# Patient Record
Sex: FEMALE | Race: WHITE | NOT HISPANIC OR LATINO | Employment: OTHER | URBAN - METROPOLITAN AREA
[De-identification: names, ages, dates, MRNs, and addresses within clinical notes are randomized per-mention and may not be internally consistent; named-entity substitution may affect disease eponyms.]

---

## 2017-01-04 ENCOUNTER — TELEPHONE (OUTPATIENT)
Dept: OBSTETRICS AND GYNECOLOGY | Facility: CLINIC | Age: 32
End: 2017-01-04

## 2017-01-04 NOTE — TELEPHONE ENCOUNTER
----- Message from Genaro Mora sent at 1/4/2017 11:52 AM CST -----  Contact: Patient  _x1st Request  _ 2nd Request  _ 3rd Request    Who: PERI SOTELO [[id number]]    Why: Patient is calling in regards to scheduling a ultra sound. Patient is needing a call back from staff.     What Number to Call Back: Patient can be reached at [phone].    When to Expect a call back: (Before the end of the day)  -- if call after 3:00 call back will be tomorrow.

## 2017-01-04 NOTE — TELEPHONE ENCOUNTER
Pt was calling to schedule anatomical scan. Advised pt February schedule is not open yet and they will call her to schedule as soon as it is open. Pt verbalized understanding

## 2017-01-26 ENCOUNTER — ROUTINE PRENATAL (OUTPATIENT)
Dept: OBSTETRICS AND GYNECOLOGY | Facility: CLINIC | Age: 32
End: 2017-01-26
Payer: COMMERCIAL

## 2017-01-26 ENCOUNTER — CLINICAL SUPPORT (OUTPATIENT)
Dept: OBSTETRICS AND GYNECOLOGY | Facility: CLINIC | Age: 32
End: 2017-01-26
Payer: COMMERCIAL

## 2017-01-26 VITALS
BODY MASS INDEX: 22.52 KG/M2 | SYSTOLIC BLOOD PRESSURE: 100 MMHG | WEIGHT: 131.19 LBS | DIASTOLIC BLOOD PRESSURE: 60 MMHG

## 2017-01-26 DIAGNOSIS — Z23 NEEDS FLU SHOT: ICD-10-CM

## 2017-01-26 DIAGNOSIS — Z34.80 SUPERVISION OF OTHER NORMAL PREGNANCY: Primary | ICD-10-CM

## 2017-01-26 DIAGNOSIS — Z23 NEEDS FLU SHOT: Primary | ICD-10-CM

## 2017-01-26 PROBLEM — K51.90 ULCERATIVE COLITIS WITHOUT COMPLICATIONS: Status: ACTIVE | Noted: 2017-01-26

## 2017-01-26 PROCEDURE — 99999 PR PBB SHADOW E&M-EST. PATIENT-LVL II: CPT | Mod: PBBFAC,,, | Performed by: NURSE PRACTITIONER

## 2017-01-26 PROCEDURE — 99999 PR PBB SHADOW E&M-EST. PATIENT-LVL I: CPT | Mod: PBBFAC,,,

## 2017-01-26 PROCEDURE — 90686 IIV4 VACC NO PRSV 0.5 ML IM: CPT | Mod: S$GLB,,, | Performed by: NURSE PRACTITIONER

## 2017-01-26 PROCEDURE — 90471 IMMUNIZATION ADMIN: CPT | Mod: S$GLB,,, | Performed by: NURSE PRACTITIONER

## 2017-01-26 PROCEDURE — 0502F SUBSEQUENT PRENATAL CARE: CPT | Mod: S$GLB,,, | Performed by: NURSE PRACTITIONER

## 2017-01-26 NOTE — PROGRESS NOTES
Here for routine OB appt at 16w3d, with complaints of nasal congestion.  Discussed OTC Regular Sudafed.  Denies VB and cramping.  Pain, bleeding, and PTL precautions given.  Flu vaccine today   MFM anatomy scan scheduled for 2/16/17  F/U scheduled 4 weeks

## 2017-02-16 ENCOUNTER — OFFICE VISIT (OUTPATIENT)
Dept: MATERNAL FETAL MEDICINE | Facility: CLINIC | Age: 32
End: 2017-02-16
Attending: OBSTETRICS & GYNECOLOGY
Payer: COMMERCIAL

## 2017-02-16 DIAGNOSIS — Z36.89 ENCOUNTER FOR FETAL ANATOMIC SURVEY: ICD-10-CM

## 2017-02-16 DIAGNOSIS — Z34.02 ENCOUNTER FOR SUPERVISION OF NORMAL FIRST PREGNANCY IN SECOND TRIMESTER: ICD-10-CM

## 2017-02-16 PROCEDURE — 76805 OB US >/= 14 WKS SNGL FETUS: CPT | Mod: S$GLB,,, | Performed by: OBSTETRICS & GYNECOLOGY

## 2017-02-16 PROCEDURE — 99499 UNLISTED E&M SERVICE: CPT | Mod: S$GLB,,, | Performed by: OBSTETRICS & GYNECOLOGY

## 2017-02-16 NOTE — PROGRESS NOTES
A detailed fetal anatomical ultrasound was completed today.  See official report in the imaging section of [facility].  Ultrasound noted no obvious fetal abnormalities.  Ultrasound findings consistent with established dating.  Rescan as clinically indicated.

## 2017-02-16 NOTE — LETTER
February 16, 2017      Ana Sampson MD  [address]  [address]  [facility] [address]           [facility]  [address]  [facility] [address]  Phone: [phone]          Patient: Mei Thomas   MR Number: [id number]   YOB: 1985   Date of Visit: 2/16/2017       Dear Dr. Ana Sampson:    Thank you for referring Mei Thomas to me for evaluation. Attached you will find relevant portions of my assessment and plan of care.    If you have questions, please do not hesitate to call me. I look forward to following Mei Thomas along with you.    Sincerely,    Mohini Alicea MD    Enclosure  CC:  No Recipients    If you would like to receive this communication electronically, please contact [email or web address] or [phone] to request more information on [facility] access.    For providers and/or their staff who would like to refer a patient to [facility], please contact us through our one-stop-shop provider referral line, [facility], at [phone].    If you feel you have received this communication in error or would no longer like to receive these types of communications, please e-mail [email or web address]

## 2017-02-20 ENCOUNTER — ROUTINE PRENATAL (OUTPATIENT)
Dept: OBSTETRICS AND GYNECOLOGY | Facility: CLINIC | Age: 32
End: 2017-02-20
Attending: OBSTETRICS & GYNECOLOGY
Payer: COMMERCIAL

## 2017-02-20 VITALS — BODY MASS INDEX: 22.74 KG/M2 | WEIGHT: 132.5 LBS | DIASTOLIC BLOOD PRESSURE: 58 MMHG | SYSTOLIC BLOOD PRESSURE: 100 MMHG

## 2017-02-20 DIAGNOSIS — Z34.02 FIRST PREGNANCY, SECOND TRIMESTER: Primary | ICD-10-CM

## 2017-02-20 PROCEDURE — 0502F SUBSEQUENT PRENATAL CARE: CPT | Mod: S$GLB,,, | Performed by: OBSTETRICS & GYNECOLOGY

## 2017-02-20 PROCEDURE — 99999 PR PBB SHADOW E&M-EST. PATIENT-LVL II: CPT | Mod: PBBFAC,,, | Performed by: OBSTETRICS & GYNECOLOGY

## 2017-02-20 NOTE — PROGRESS NOTES
Patient complaining of bloating - No cramping, no VB, good FM, no LOF. OB glucose and CBC next visit.

## 2017-02-20 NOTE — MR AVS SNAPSHOT
[facility] [address]  [address]  [facility] [address]  Phone: [phone]  Fax: [phone]                  Mei Thomas   2017 11:15 AM   Routine Prenatal    Description:  Female : 1985   Provider:  Ana Sampson MD   Department:  [facility] [address]           Reason for Visit     Routine Prenatal Visit                To Do List           Goals (5 Years of Data)     None      [facility]     [facility] Nurse Care Line -  Assistance  Registered nurses in the [facility] Center provide clinical advisement, health education, appointment booking, and other advisory services.  Call for this free service at [phone].             Medications           Message regarding Medications     Verify the changes and/or additions to your medication regime listed below are the same as discussed with your clinician today.  If any of these changes or additions are incorrect, please notify your healthcare provider.             Verify that the below list of medications is an accurate representation of the medications you are currently taking.  If none reported, the list may be blank. If incorrect, please contact your healthcare provider. Carry this list with you in case of emergency.           Current Medications     CANASA 1,000 mg Supp     prenatal vitamin #45-iron-FA 28 mg iron- 1 mg Chew Take by mouth.    albuterol 90 mcg/actuation inhaler Take one to two puffs q 4-q6 hrs as needed for cough    valacyclovir (VALTREX) 1000 MG tablet For an outbreak take 2 tablets, followed 12 hours later by 2 more tablets.           Clinical Reference Information           Prenatal Vitals     Enc. Date GA Prenatal Vitals Prenatal Pulse Pain Level Urine Albumin/Glucose Edema Presentation Dilation/Effacement/Station    17 20w0d 100/58 (A) / 60.1 kg (132 lb 7.9 oz)   0 Negative / Negative       17 16w3d 100/60 / 59.5 kg (131 lb 2.8 oz)  / 145 / Absent  0  Negative / Negative None / None / None / No      16 12w3d 108/60 / 56.9 kg (125 lb 7.1 oz)  / 162  0 Negative / Negative          TW.307 kg (9 lb 7.9 oz)   Pregravid weight: 55.8 kg (123 lb)   Number of fetuses: 1       Your Vitals Were     BP Weight Last Period BMI       100/58 60.1 kg (132 lb 7.9 oz) 10/03/2016 (Exact Date) 22.74 kg/m2       Allergies as of 2017     No Known Allergies      Immunizations Administered on Date of Encounter - 2017     None      Language Assistance Services     ATTENTION: Language assistance services are available, free of charge. Please call [phone].      ATENCIÓN: Si ajla quentin, tiene a trent disposición servicios gratuitos de asistencia lingüística. Llame al [phone].     CHÚ Ý: N?u b?n nói Ti?ng Vi?t, có các d?ch v? h? tr? ngôn ng? mi?n phí dành cho b?n. G?i s? [phone].         [facility] [address] complies with applicable Federal civil rights laws and does not discriminate on the basis of race, color, national origin, age, disability, or sex.

## 2017-03-21 ENCOUNTER — ROUTINE PRENATAL (OUTPATIENT)
Dept: OBSTETRICS AND GYNECOLOGY | Facility: CLINIC | Age: 32
End: 2017-03-21
Payer: COMMERCIAL

## 2017-03-21 ENCOUNTER — LAB VISIT (OUTPATIENT)
Dept: LAB | Facility: OTHER | Age: 32
End: 2017-03-21
Attending: OBSTETRICS & GYNECOLOGY
Payer: COMMERCIAL

## 2017-03-21 VITALS
BODY MASS INDEX: 23.61 KG/M2 | WEIGHT: 137.56 LBS | SYSTOLIC BLOOD PRESSURE: 100 MMHG | DIASTOLIC BLOOD PRESSURE: 60 MMHG

## 2017-03-21 DIAGNOSIS — Z23 NEED FOR TDAP VACCINATION: ICD-10-CM

## 2017-03-21 DIAGNOSIS — Z34.02 FIRST PREGNANCY, SECOND TRIMESTER: ICD-10-CM

## 2017-03-21 DIAGNOSIS — Z34.80 SUPERVISION OF OTHER NORMAL PREGNANCY: Primary | ICD-10-CM

## 2017-03-21 LAB
BASOPHILS # BLD AUTO: 0.02 K/UL
BASOPHILS NFR BLD: 0.3 %
DIFFERENTIAL METHOD: ABNORMAL
EOSINOPHIL # BLD AUTO: 0.3 K/UL
EOSINOPHIL NFR BLD: 3.9 %
ERYTHROCYTE [DISTWIDTH] IN BLOOD BY AUTOMATED COUNT: 14.3 %
GLUCOSE SERPL-MCNC: 77 MG/DL
HCT VFR BLD AUTO: 33.5 %
HGB BLD-MCNC: 11.1 G/DL
LYMPHOCYTES # BLD AUTO: 1 K/UL
LYMPHOCYTES NFR BLD: 13.2 %
MCH RBC QN AUTO: 30.7 PG
MCHC RBC AUTO-ENTMCNC: 33.1 %
MCV RBC AUTO: 93 FL
MONOCYTES # BLD AUTO: 0.4 K/UL
MONOCYTES NFR BLD: 5.7 %
NEUTROPHILS # BLD AUTO: 6 K/UL
NEUTROPHILS NFR BLD: 76.5 %
PLATELET # BLD AUTO: 145 K/UL
PMV BLD AUTO: 10.7 FL
RBC # BLD AUTO: 3.61 M/UL
WBC # BLD AUTO: 7.78 K/UL

## 2017-03-21 PROCEDURE — 99999 PR PBB SHADOW E&M-EST. PATIENT-LVL II: CPT | Mod: PBBFAC,,, | Performed by: NURSE PRACTITIONER

## 2017-03-21 PROCEDURE — 85025 COMPLETE CBC W/AUTO DIFF WBC: CPT

## 2017-03-21 PROCEDURE — 36415 COLL VENOUS BLD VENIPUNCTURE: CPT

## 2017-03-21 PROCEDURE — 0502F SUBSEQUENT PRENATAL CARE: CPT | Mod: S$GLB,,, | Performed by: NURSE PRACTITIONER

## 2017-03-21 PROCEDURE — 82950 GLUCOSE TEST: CPT

## 2017-03-21 NOTE — MR AVS SNAPSHOT
[facility] [address]  [address]  [facility] [address]  Phone: [phone]  Fax: [phone]                  Mei Thomas   3/21/2017 8:20 AM   Routine Prenatal    Description:  Female : 1985   Provider:  Dulce Fierro NP   Department:  [facility] [address]           Reason for Visit     Routine Prenatal Visit                To Do List           Goals (5 Years of Data)     None      [facility]     [facility] Nurse Care Line -  Assistance  Registered nurses in the [facility] provide clinical advisement, health education, appointment booking, and other advisory services.  Call for this free service at [phone].             Medications           Message regarding Medications     Verify the changes and/or additions to your medication regime listed below are the same as discussed with your clinician today.  If any of these changes or additions are incorrect, please notify your healthcare provider.             Verify that the below list of medications is an accurate representation of the medications you are currently taking.  If none reported, the list may be blank. If incorrect, please contact your healthcare provider. Carry this list with you in case of emergency.           Current Medications     albuterol 90 mcg/actuation inhaler Take one to two puffs q 4-q6 hrs as needed for cough    CANASA 1,000 mg Supp     prenatal vitamin #45-iron-FA 28 mg iron- 1 mg Chew Take by mouth.    valacyclovir (VALTREX) 1000 MG tablet For an outbreak take 2 tablets, followed 12 hours later by 2 more tablets.           Clinical Reference Information           Prenatal Vitals     Enc. Date GA Prenatal Vitals Prenatal Pulse Pain Level Urine Albumin/Glucose Edema Presentation Dilation/Effacement/Station    3/21/17 24w1d 100/60 / 62.4 kg (137 lb 9.1 oz)  /  / Present  0 Negative / Negative       17 20w0d 100/58 (A) / 60.1 kg (132 lb 7.9 oz)  /  / Present   0 Negative / Negative None / None / None      17 16w3d 100/60 / 59.5 kg (131 lb 2.8 oz)  / 145 / Absent  0 Negative / Negative None / None / None / No      16 12w3d 108/60 / 56.9 kg (125 lb 7.1 oz)  / 162  0 Negative / Negative          TW.607 kg (14 lb 9.1 oz)   Pregravid weight: 55.8 kg (123 lb)   Number of fetuses: 1       Your Vitals Were     BP Weight Last Period BMI       100/60 62.4 kg (137 lb 9.1 oz) 10/03/2016 (Exact Date) 23.61 kg/m2       Allergies as of 3/21/2017     No Known Allergies      Immunizations Administered on Date of Encounter - 3/21/2017     None      Language Assistance Services     ATTENTION: Language assistance services are available, free of charge. Please call [phone].      ATENCIÓN: Si habla español, tiene a trent disposición servicios gratuitos de asistencia lingüística. Llame al [phone].     CHÚ Ý: N?u b?n nói Ti?ng Vi?t, có các d?ch v? h? tr? ngôn ng? mi?n phí dành cho b?n. G?i s? [phone].         [facility] [address] complies with applicable Federal civil rights laws and does not discriminate on the basis of race, color, national origin, age, disability, or sex.

## 2017-03-21 NOTE — PROGRESS NOTES
Here for routine OB appt at 24w1d, with complaints of sensation that she is not completely finished when she has bowel movements.  Discussed Colace PRN.  Reports + FM.  Denies VB and cramping.  Pain, bleeding, and PTL precautions given.  OB glucose and CBC today.  Tdap with next appt.  F/U scheduled 4 weeks

## 2017-04-13 ENCOUNTER — TELEPHONE (OUTPATIENT)
Dept: OBSTETRICS AND GYNECOLOGY | Facility: CLINIC | Age: 32
End: 2017-04-13

## 2017-04-13 NOTE — TELEPHONE ENCOUNTER
----- Message from Anisa Merchant sent at 4/13/2017  4:00 PM CDT -----  Contact: self  Patient is needing to r/s her appointment for 4/21/17. Patient can be reached at [phone].

## 2017-04-26 ENCOUNTER — CLINICAL SUPPORT (OUTPATIENT)
Dept: OBSTETRICS AND GYNECOLOGY | Facility: CLINIC | Age: 32
End: 2017-04-26
Payer: COMMERCIAL

## 2017-04-26 ENCOUNTER — ROUTINE PRENATAL (OUTPATIENT)
Dept: OBSTETRICS AND GYNECOLOGY | Facility: CLINIC | Age: 32
End: 2017-04-26
Attending: OBSTETRICS & GYNECOLOGY
Payer: COMMERCIAL

## 2017-04-26 VITALS — BODY MASS INDEX: 24.03 KG/M2 | WEIGHT: 140 LBS | DIASTOLIC BLOOD PRESSURE: 60 MMHG | SYSTOLIC BLOOD PRESSURE: 100 MMHG

## 2017-04-26 DIAGNOSIS — N89.8 VAGINAL DISCHARGE: Primary | ICD-10-CM

## 2017-04-26 DIAGNOSIS — Z23 NEED FOR TDAP VACCINATION: Primary | ICD-10-CM

## 2017-04-26 DIAGNOSIS — Z34.03 ENCOUNTER FOR SUPERVISION OF NORMAL FIRST PREGNANCY IN THIRD TRIMESTER: ICD-10-CM

## 2017-04-26 LAB
CANDIDA RRNA VAG QL PROBE: NEGATIVE
G VAGINALIS RRNA GENITAL QL PROBE: NEGATIVE
T VAGINALIS RRNA GENITAL QL PROBE: NEGATIVE

## 2017-04-26 PROCEDURE — 0502F SUBSEQUENT PRENATAL CARE: CPT | Mod: S$GLB,,, | Performed by: OBSTETRICS & GYNECOLOGY

## 2017-04-26 PROCEDURE — 90471 IMMUNIZATION ADMIN: CPT | Mod: S$GLB,,, | Performed by: NURSE PRACTITIONER

## 2017-04-26 PROCEDURE — 99999 PR PBB SHADOW E&M-EST. PATIENT-LVL I: CPT | Mod: PBBFAC,,,

## 2017-04-26 PROCEDURE — 87480 CANDIDA DNA DIR PROBE: CPT

## 2017-04-26 PROCEDURE — 90715 TDAP VACCINE 7 YRS/> IM: CPT | Mod: S$GLB,,, | Performed by: NURSE PRACTITIONER

## 2017-04-26 PROCEDURE — 99999 PR PBB SHADOW E&M-EST. PATIENT-LVL III: CPT | Mod: PBBFAC,,, | Performed by: OBSTETRICS & GYNECOLOGY

## 2017-04-26 NOTE — MR AVS SNAPSHOT
[facility] [address]  [address]  [facility] [address]  Phone: [phone]  Fax: [phone]                  Mei Thomas   2017 11:15 AM   Routine Prenatal    Description:  Female : 1985   Provider:  Ana Sampson MD   Department:  [facility] [address]           Reason for Visit     Routine Prenatal Visit                To Do List           Goals (5 Years of Data)     None      [facility]     [facility] On Call Nurse Care Line -  Assistance  Unless otherwise directed by your provider, please contact [facility], our nurse care line that is available for  assistance.     Registered nurses in the [facility] On Call Center provide: appointment scheduling, clinical advisement, health education, and other advisory services.  Call: [phone] (toll free)               Medications           Message regarding Medications     Verify the changes and/or additions to your medication regime listed below are the same as discussed with your clinician today.  If any of these changes or additions are incorrect, please notify your healthcare provider.             Verify that the below list of medications is an accurate representation of the medications you are currently taking.  If none reported, the list may be blank. If incorrect, please contact your healthcare provider. Carry this list with you in case of emergency.           Current Medications     albuterol 90 mcg/actuation inhaler Take one to two puffs q 4-q6 hrs as needed for cough    CANASA 1,000 mg Supp     prenatal vitamin #45-iron-FA 28 mg iron- 1 mg Chew Take by mouth.    valacyclovir (VALTREX) 1000 MG tablet For an outbreak take 2 tablets, followed 12 hours later by 2 more tablets.           Clinical Reference Information           Prenatal Vitals     Enc. Date GA Prenatal Vitals Prenatal Pulse Pain Level Urine Albumin/Glucose Edema Presentation Dilation/Effacement/Station    17 29w2d 100/60 /  63.5 kg (139 lb 15.9 oz)   0 Negative / Negative       3/21/17 24w1d 100/60 / 62.4 kg (137 lb 9.1 oz) 24 cm / 154 / Present  0 Negative / Negative None / None / None / No      17 20w0d 100/58 (A) / 60.1 kg (132 lb 7.9 oz)  /  / Present  0 Negative / Negative None / None / None      17 16w3d 100/60 / 59.5 kg (131 lb 2.8 oz)  / 145 / Absent  0 Negative / Negative None / None / None / No      16 12w3d 108/60 / 56.9 kg (125 lb 7.1 oz)  / 162  0 Negative / Negative          TW.707 kg (16 lb 15.9 oz)   Pregravid weight: 55.8 kg (123 lb)   Number of fetuses: 1       Your Vitals Were     BP Weight Last Period BMI       100/60 63.5 kg (139 lb 15.9 oz) 10/03/2016 (Exact Date) 24.03 kg/m2       Allergies as of 2017     No Known Allergies      Immunizations Administered on Date of Encounter - 2017     None      Language Assistance Services     ATTENTION: Language assistance services are available, free of charge. Please call [phone].      ATENCIÓN: Si josue saavedra, tiene a trent disposición servicios gratuitos de asistencia lingüística. Llame al [phone].     [facility] Ý: N?u b?n nói Ti?ng Vi?t, có các d?ch v? h? tr? ngôn ng? mi?n phí dành cho b?n. G?i s? [phone].         [facility] [address] complies with applicable Federal civil rights laws and does not discriminate on the basis of race, color, national origin, age, disability, or sex.

## 2017-04-26 NOTE — PROGRESS NOTES
No cramping, no VB, good FM, no LOF.PAtient complaining of vaginal discharge. Affirm done. Follow up in 3 weeks. PTL/bleeding precautions.

## 2017-04-28 NOTE — PROGRESS NOTES
Here for TDAP injection. Patient without complaint of pain at this time ,Injection given. Tolerated well. No pain noted after injection.  Advised to wait in lobby 15 minutes and report any adverse reactions.         Site:  LD

## 2017-05-10 ENCOUNTER — ROUTINE PRENATAL (OUTPATIENT)
Dept: OBSTETRICS AND GYNECOLOGY | Facility: CLINIC | Age: 32
End: 2017-05-10
Payer: COMMERCIAL

## 2017-05-10 VITALS
SYSTOLIC BLOOD PRESSURE: 102 MMHG | BODY MASS INDEX: 24.56 KG/M2 | WEIGHT: 143.06 LBS | DIASTOLIC BLOOD PRESSURE: 50 MMHG

## 2017-05-10 DIAGNOSIS — Z34.80 SUPERVISION OF OTHER NORMAL PREGNANCY: Primary | ICD-10-CM

## 2017-05-10 DIAGNOSIS — O22.43 HEMORRHOIDS DURING PREGNANCY IN THIRD TRIMESTER: ICD-10-CM

## 2017-05-10 PROCEDURE — 0502F SUBSEQUENT PRENATAL CARE: CPT | Mod: S$GLB,,, | Performed by: NURSE PRACTITIONER

## 2017-05-10 PROCEDURE — 99999 PR PBB SHADOW E&M-EST. PATIENT-LVL II: CPT | Mod: PBBFAC,,, | Performed by: NURSE PRACTITIONER

## 2017-05-10 RX ORDER — HYDROCORTISONE ACETATE PRAMOXINE HCL 1; 1 G/100G; G/100G
CREAM TOPICAL 3 TIMES DAILY
Qty: 28.4 G | Refills: 1 | Status: SHIPPED | OUTPATIENT
Start: 2017-05-10

## 2017-05-10 RX ORDER — HYDROCORTISONE ACETATE 25 MG/1
25 SUPPOSITORY RECTAL 2 TIMES DAILY
Qty: 12 SUPPOSITORY | Refills: 1 | Status: SHIPPED | OUTPATIENT
Start: 2017-05-10 | End: 2018-05-10

## 2017-05-10 NOTE — PROGRESS NOTES
Here for routine OB appt at 31w2d, with complaints of hemorrhoids.  Discussed Colace, Tucks pads, RX for cream and suppositories sent.  Reports good FM.  Denies LOF, denies VB, denies contractions.  Reviewed warning signs of Labor and Preeclampsia.  Daily FM counts reinforced.  Peds- in [facility].  Plans to breastfeed- will order pump.  F/U scheduled 2 weeks

## 2017-05-10 NOTE — MR AVS SNAPSHOT
[facility] [address]  [address]  [facility] [address]  Phone: [phone]  Fax: [phone]                  Mei Thomas   5/10/2017 8:40 AM   Routine Prenatal    Description:  Female : 1985   Provider:  Dulce Fierro NP   Department:  [facility] [address]           Reason for Visit     Routine Prenatal Visit                To Do List           Goals (5 Years of Data)     None      Follow-Up and Disposition     Return in about 2 weeks (around 2017).      [facility] On Call     [facility] On Call Nurse Care Line -  Assistance  Unless otherwise directed by your provider, please contact [facility], our nurse care line that is available for  assistance.     Registered nurses in the [facility] On Call Center provide: appointment scheduling, clinical advisement, health education, and other advisory services.  Call: [phone] (toll free)               Medications           Message regarding Medications     Verify the changes and/or additions to your medication regime listed below are the same as discussed with your clinician today.  If any of these changes or additions are incorrect, please notify your healthcare provider.             Verify that the below list of medications is an accurate representation of the medications you are currently taking.  If none reported, the list may be blank. If incorrect, please contact your healthcare provider. Carry this list with you in case of emergency.           Current Medications     CANASA 1,000 mg Supp     prenatal vitamin #45-iron-FA 28 mg iron- 1 mg Chew Take by mouth.    albuterol 90 mcg/actuation inhaler Take one to two puffs q 4-q6 hrs as needed for cough    valacyclovir (VALTREX) 1000 MG tablet For an outbreak take 2 tablets, followed 12 hours later by 2 more tablets.           Clinical Reference Information           Prenatal Vitals     Enc. Date GA Prenatal Vitals Prenatal Pulse Pain Level Urine  Albumin/Glucose Edema Presentation Dilation/Effacement/Station    5/10/17 31w2d 102/50 (A) / 64.9 kg (143 lb 1.3 oz)  /  / Present  0 Negative / Negative       17 29w2d 100/60 / 63.5 kg (139 lb 15.9 oz) 29 cm / 145 / Present  0 Negative / Negative None / None / None      3/21/17 24w1d 100/60 / 62.4 kg (137 lb 9.1 oz) 24 cm / 154 / Present  0 Negative / Negative None / None / None / No      17 20w0d 100/58 (A) / 60.1 kg (132 lb 7.9 oz)  /  / Present  0 Negative / Negative None / None / None      17 16w3d 100/60 / 59.5 kg (131 lb 2.8 oz)  / 145 / Absent  0 Negative / Negative None / None / None / No      16 12w3d 108/60 / 56.9 kg (125 lb 7.1 oz)  / 162  0 Negative / Negative          TW.108 kg (20 lb 1.3 oz)   Pregravid weight: 55.8 kg (123 lb)   Number of fetuses: 1       Your Vitals Were     BP Weight Last Period BMI       102/50 64.9 kg (143 lb 1.3 oz) 10/03/2016 (Exact Date) 24.56 kg/m2       Allergies as of 5/10/2017     No Known Allergies      Immunizations Administered on Date of Encounter - 5/10/2017     None      Language Assistance Services     ATTENTION: Language assistance services are available, free of charge. Please call [phone].      ATENCIÓN: Si habla español, tiene a trent disposición servicios gratuitos de asistencia lingüística. Llame al [phone].     CHÚ Ý: N?u b?n nói Ti?ng Vi?t, có các d?ch v? h? tr? ngôn ng? mi?n phí dành cho b?n. G?i s? [phone].         [facility] [address] complies with applicable Federal civil rights laws and does not discriminate on the basis of race, color, national origin, age, disability, or sex.

## 2017-05-12 ENCOUNTER — TELEPHONE (OUTPATIENT)
Dept: OBSTETRICS AND GYNECOLOGY | Facility: CLINIC | Age: 32
End: 2017-05-12

## 2017-05-12 NOTE — TELEPHONE ENCOUNTER
----- Message from Dulce Fierro NP sent at 5/10/2017  9:08 AM CDT -----  Hello.  Please call pt and schedule her for a f/u routine OB pt with Dr. Sampson in 2 weeks.  Thanks, Dulce

## 2017-05-25 ENCOUNTER — ROUTINE PRENATAL (OUTPATIENT)
Dept: OBSTETRICS AND GYNECOLOGY | Facility: CLINIC | Age: 32
End: 2017-05-25
Attending: OBSTETRICS & GYNECOLOGY
Payer: COMMERCIAL

## 2017-05-25 VITALS
WEIGHT: 142.88 LBS | DIASTOLIC BLOOD PRESSURE: 60 MMHG | SYSTOLIC BLOOD PRESSURE: 102 MMHG | BODY MASS INDEX: 24.52 KG/M2

## 2017-05-25 DIAGNOSIS — Z34.03 ENCOUNTER FOR SUPERVISION OF NORMAL FIRST PREGNANCY IN THIRD TRIMESTER: Primary | ICD-10-CM

## 2017-05-25 PROCEDURE — 59425 ANTEPARTUM CARE ONLY: CPT | Mod: S$GLB,,, | Performed by: OBSTETRICS & GYNECOLOGY

## 2017-05-25 PROCEDURE — 99999 PR PBB SHADOW E&M-EST. PATIENT-LVL II: CPT | Mod: PBBFAC,,, | Performed by: OBSTETRICS & GYNECOLOGY

## 2017-05-26 NOTE — PROGRESS NOTES
No cramping, no VB, good FM, no LOF.PTL/bleeding precautions. Patient moving to [facility] OB appointment scheduled. Follow up in 2 weeks.

## 2023-04-03 DIAGNOSIS — D69.6 THROMBOCYTOPENIA, UNSPECIFIED: Primary | ICD-10-CM

## 2023-04-24 ENCOUNTER — OFFICE VISIT (OUTPATIENT)
Dept: HEMATOLOGY/ONCOLOGY | Facility: CLINIC | Age: 38
End: 2023-04-24
Payer: COMMERCIAL

## 2023-04-24 VITALS
WEIGHT: 127 LBS | TEMPERATURE: 98 F | BODY MASS INDEX: 21.16 KG/M2 | RESPIRATION RATE: 14 BRPM | SYSTOLIC BLOOD PRESSURE: 112 MMHG | DIASTOLIC BLOOD PRESSURE: 64 MMHG | HEART RATE: 76 BPM | HEIGHT: 65 IN | OXYGEN SATURATION: 96 %

## 2023-04-24 DIAGNOSIS — D69.6 THROMBOCYTOPENIA: ICD-10-CM

## 2023-04-24 DIAGNOSIS — D69.6 THROMBOCYTOPENIA, UNSPECIFIED: ICD-10-CM

## 2023-04-24 LAB
ALBUMIN SERPL-MCNC: 4.3 G/DL (ref 3.5–5)
ALBUMIN/GLOB SERPL: 1.5 RATIO (ref 1.1–2)
ALP SERPL-CCNC: 81 UNIT/L (ref 40–150)
ALT SERPL-CCNC: 38 UNIT/L (ref 0–55)
APTT PPP: 34 SECONDS (ref 23.2–33.7)
AST SERPL-CCNC: 27 UNIT/L (ref 5–34)
BASOPHILS # BLD AUTO: 0.03 X10(3)/MCL (ref 0–0.2)
BASOPHILS NFR BLD AUTO: 0.5 %
BILIRUBIN DIRECT+TOT PNL SERPL-MCNC: 0.5 MG/DL
BUN SERPL-MCNC: 18.5 MG/DL (ref 7–18.7)
CALCIUM SERPL-MCNC: 9.4 MG/DL (ref 8.4–10.2)
CHLORIDE SERPL-SCNC: 108 MMOL/L (ref 98–107)
CO2 SERPL-SCNC: 29 MMOL/L (ref 22–29)
CREAT SERPL-MCNC: 0.77 MG/DL (ref 0.55–1.02)
EOSINOPHIL # BLD AUTO: 0.52 X10(3)/MCL (ref 0–0.9)
EOSINOPHIL NFR BLD AUTO: 8.4 %
ERYTHROCYTE [DISTWIDTH] IN BLOOD BY AUTOMATED COUNT: 12 % (ref 11.5–17)
FOLATE SERPL-MCNC: 16.8 NG/ML (ref 7–31.4)
GFR SERPLBLD CREATININE-BSD FMLA CKD-EPI: >60 MLS/MIN/1.73/M2
GLOBULIN SER-MCNC: 2.8 GM/DL (ref 2.4–3.5)
GLUCOSE SERPL-MCNC: 115 MG/DL (ref 74–100)
HCT VFR BLD AUTO: 42 % (ref 37–47)
HEMATOLOGIST REVIEW: NORMAL
HGB BLD-MCNC: 13.5 G/DL (ref 12–16)
IMM GRANULOCYTES # BLD AUTO: 0 X10(3)/MCL (ref 0–0.04)
IMM GRANULOCYTES NFR BLD AUTO: 0 %
INR BLD: 1.04 (ref 0–1.3)
LYMPHOCYTES # BLD AUTO: 1.73 X10(3)/MCL (ref 0.6–4.6)
LYMPHOCYTES NFR BLD AUTO: 28 %
MCH RBC QN AUTO: 29.2 PG (ref 27–31)
MCHC RBC AUTO-ENTMCNC: 32.1 G/DL (ref 33–36)
MCV RBC AUTO: 90.9 FL (ref 80–94)
MONOCYTES # BLD AUTO: 0.43 X10(3)/MCL (ref 0.1–1.3)
MONOCYTES NFR BLD AUTO: 7 %
NEUTROPHILS # BLD AUTO: 3.46 X10(3)/MCL (ref 2.1–9.2)
NEUTROPHILS NFR BLD AUTO: 56.1 %
PLATELET # BLD AUTO: 174 X10(3)/MCL (ref 130–400)
PMV BLD AUTO: 10.7 FL (ref 7.4–10.4)
POTASSIUM SERPL-SCNC: 4.1 MMOL/L (ref 3.5–5.1)
PROT SERPL-MCNC: 7.1 GM/DL (ref 6.4–8.3)
PROTHROMBIN TIME: 13.5 SECONDS (ref 12.5–14.5)
RBC # BLD AUTO: 4.62 X10(6)/MCL (ref 4.2–5.4)
SODIUM SERPL-SCNC: 143 MMOL/L (ref 136–145)
VIT B12 SERPL-MCNC: 456 PG/ML (ref 213–816)
WBC # SPEC AUTO: 6.2 X10(3)/MCL (ref 4.5–11.5)

## 2023-04-24 PROCEDURE — 3078F PR MOST RECENT DIASTOLIC BLOOD PRESSURE < 80 MM HG: ICD-10-PCS | Mod: CPTII,S$GLB,, | Performed by: INTERNAL MEDICINE

## 2023-04-24 PROCEDURE — 3074F PR MOST RECENT SYSTOLIC BLOOD PRESSURE < 130 MM HG: ICD-10-PCS | Mod: CPTII,S$GLB,, | Performed by: INTERNAL MEDICINE

## 2023-04-24 PROCEDURE — 99204 OFFICE O/P NEW MOD 45 MIN: CPT | Mod: S$GLB,,, | Performed by: INTERNAL MEDICINE

## 2023-04-24 PROCEDURE — 3008F PR BODY MASS INDEX (BMI) DOCUMENTED: ICD-10-PCS | Mod: CPTII,S$GLB,, | Performed by: INTERNAL MEDICINE

## 2023-04-24 PROCEDURE — 3008F BODY MASS INDEX DOCD: CPT | Mod: CPTII,S$GLB,, | Performed by: INTERNAL MEDICINE

## 2023-04-24 PROCEDURE — 85610 PROTHROMBIN TIME: CPT | Performed by: INTERNAL MEDICINE

## 2023-04-24 PROCEDURE — 85025 COMPLETE CBC W/AUTO DIFF WBC: CPT | Performed by: INTERNAL MEDICINE

## 2023-04-24 PROCEDURE — 85730 THROMBOPLASTIN TIME PARTIAL: CPT | Performed by: INTERNAL MEDICINE

## 2023-04-24 PROCEDURE — 1159F MED LIST DOCD IN RCRD: CPT | Mod: CPTII,S$GLB,, | Performed by: INTERNAL MEDICINE

## 2023-04-24 PROCEDURE — 99999 PR PBB SHADOW E&M-EST. PATIENT-LVL IV: CPT | Mod: PBBFAC,,, | Performed by: INTERNAL MEDICINE

## 2023-04-24 PROCEDURE — 36415 COLL VENOUS BLD VENIPUNCTURE: CPT | Performed by: INTERNAL MEDICINE

## 2023-04-24 PROCEDURE — 85060 BLOOD SMEAR INTERPRETATION: CPT | Performed by: INTERNAL MEDICINE

## 2023-04-24 PROCEDURE — 99204 PR OFFICE/OUTPT VISIT, NEW, LEVL IV, 45-59 MIN: ICD-10-PCS | Mod: S$GLB,,, | Performed by: INTERNAL MEDICINE

## 2023-04-24 PROCEDURE — 80053 COMPREHEN METABOLIC PANEL: CPT | Performed by: INTERNAL MEDICINE

## 2023-04-24 PROCEDURE — 3078F DIAST BP <80 MM HG: CPT | Mod: CPTII,S$GLB,, | Performed by: INTERNAL MEDICINE

## 2023-04-24 PROCEDURE — 1160F PR REVIEW ALL MEDS BY PRESCRIBER/CLIN PHARMACIST DOCUMENTED: ICD-10-PCS | Mod: CPTII,S$GLB,, | Performed by: INTERNAL MEDICINE

## 2023-04-24 PROCEDURE — 1160F RVW MEDS BY RX/DR IN RCRD: CPT | Mod: CPTII,S$GLB,, | Performed by: INTERNAL MEDICINE

## 2023-04-24 PROCEDURE — 82746 ASSAY OF FOLIC ACID SERUM: CPT | Performed by: INTERNAL MEDICINE

## 2023-04-24 PROCEDURE — 82607 VITAMIN B-12: CPT | Performed by: INTERNAL MEDICINE

## 2023-04-24 PROCEDURE — 1159F PR MEDICATION LIST DOCUMENTED IN MEDICAL RECORD: ICD-10-PCS | Mod: CPTII,S$GLB,, | Performed by: INTERNAL MEDICINE

## 2023-04-24 PROCEDURE — 3074F SYST BP LT 130 MM HG: CPT | Mod: CPTII,S$GLB,, | Performed by: INTERNAL MEDICINE

## 2023-04-24 PROCEDURE — 86023 IMMUNOGLOBULIN ASSAY: CPT | Performed by: INTERNAL MEDICINE

## 2023-04-24 PROCEDURE — 99999 PR PBB SHADOW E&M-EST. PATIENT-LVL IV: ICD-10-PCS | Mod: PBBFAC,,, | Performed by: INTERNAL MEDICINE

## 2023-04-24 RX ORDER — SERTRALINE HYDROCHLORIDE 100 MG/1
1 TABLET, FILM COATED ORAL EVERY MORNING
COMMUNITY

## 2023-04-24 RX ORDER — MESALAMINE 1.2 G/1
2.4 TABLET, DELAYED RELEASE ORAL
COMMUNITY
Start: 2022-11-06

## 2023-04-24 RX ORDER — CHOLECALCIFEROL (VITAMIN D3) 625 MCG
25000 CAPSULE ORAL
COMMUNITY
Start: 2022-11-21

## 2023-04-24 NOTE — PROGRESS NOTES
Referring physician: Dr. Anais Washburn  Reason for referral: Thrombocytopenia      Subjective:       Patient ID: Mei Thomas is a 38 y.o. female.    Thrombocytopenia--Since 2016    Platelets:  11/30/16--146  03/21/17--145  02/10/23--97  03/24/23--141    Current treatment: Observation    Chief Complaint: Other Misc (NPH)    HPI  39 yo female found on labs by OBGYN to have mild thrombocytopenia during pregnancy, platelets 97K at delivery 2/10/23 with repeat level 6 weeks post-partum with platelets slightly low 141. Patient noted on labs from 2017 to have platelets of 145K, low normal. Patient with h/o ulcerative colitis, diagnosed 4 years ago, was previously on Canasa, now on Mesalamine with no problems, colitis currently well controlled. FH otherwise negative for any other autoimmune conditions. Patient denies any problems with bleeding.     Past Medical History:   Diagnosis Date    Melanoma 2010      Past Surgical History:   Procedure Laterality Date    CYST REMOVAL      MOLE REMOVAL      MOUTH SURGERY       Family History   Problem Relation Age of Onset    Breast cancer Paternal Grandmother         dx in age 50's    Stroke Maternal Grandfather     No Known Problems Mother     Cancer Father         basal cell skin cancer    No Known Problems Sister     Multiple sclerosis Maternal Grandmother     No Known Problems Sister     Colon cancer Neg Hx     Ovarian cancer Neg Hx     Diabetes Neg Hx     Hypertension Neg Hx      Social History     Socioeconomic History    Marital status:    Occupational History    Occupation: [employer] for [employer]   Tobacco Use    Smoking status: Never    Smokeless tobacco: Never   Substance and Sexual Activity    Alcohol use: Yes     Comment: social - few drinks a week( Pre pregnancy)    Drug use: No    Sexual activity: Yes     Partners: Male     Birth control/protection: None   Social History Narrative    Lives w/.  No children.         Review of patient's allergies  indicates:  No Known Allergies   Current Outpatient Medications on File Prior to Visit   Medication Sig Dispense Refill    DECARA 625 mcg (25,000 unit) Cap capsule Take 25,000 Units by mouth every 7 days.      mesalamine (LIALDA) 1.2 gram TbEC Take 2.4 g by mouth.      prenatal vitamin #45-iron-FA 28 mg iron- 1 mg Chew Take by mouth.      sertraline (ZOLOFT) 100 MG tablet Take 1 tablet by mouth every morning.      valacyclovir (VALTREX) 1000 MG tablet For an outbreak take 2 tablets, followed 12 hours later by 2 more tablets. (Patient taking differently: as needed. For an outbreak take 2 tablets, followed 12 hours later by 2 more tablets.) 24 tablet 0    albuterol 90 mcg/actuation inhaler Take one to two puffs q 4-q6 hrs as needed for cough (Patient not taking: Reported on 4/24/2023) 18 g 0    CANASA 1,000 mg Supp       pramoxine-hydrocortisone (PROCTOCREAM-HC) 1-1 % rectal cream Place rectally 3 (three) times daily. (Patient not taking: Reported on 4/24/2023) 28.4 g 1     No current facility-administered medications on file prior to visit.      Review of Systems   Constitutional:  Negative for appetite change, fatigue, fever and unexpected weight change.   HENT:  Negative for mouth sores.    Eyes: Negative.    Respiratory:  Negative for cough and shortness of breath.    Cardiovascular:  Negative for chest pain and leg swelling.   Gastrointestinal:  Negative for abdominal distention, abdominal pain, constipation, diarrhea, nausea, vomiting and reflux.   Genitourinary:  Negative for difficulty urinating, dysuria and hematuria.   Musculoskeletal:  Negative for arthralgias and back pain.   Integumentary:  Negative for rash.   Neurological:  Negative for weakness and headaches.   Hematological:  Negative for adenopathy.   Psychiatric/Behavioral:  Negative for sleep disturbance. The patient is not nervous/anxious.           Vitals:    04/24/23 1318   BP: 112/64   Pulse: 76   Resp: 14   Temp: 98.1 °F (36.7 °C)   SpO2: 96%  "  Weight: 57.6 kg (127 lb)   Height: 5' 4.8" (1.646 m)      Physical Exam  Constitutional:       Appearance: Normal appearance.   HENT:      Head: Normocephalic.      Nose: Nose normal.      Mouth/Throat:      Mouth: Mucous membranes are moist.   Eyes:      Extraocular Movements: Extraocular movements intact.      Conjunctiva/sclera: Conjunctivae normal.   Cardiovascular:      Rate and Rhythm: Normal rate and regular rhythm.   Pulmonary:      Effort: Pulmonary effort is normal.      Breath sounds: Normal breath sounds.   Abdominal:      General: Bowel sounds are normal. There is no distension.      Palpations: Abdomen is soft.      Tenderness: There is no abdominal tenderness.   Musculoskeletal:         General: Normal range of motion.   Skin:     General: Skin is warm.   Neurological:      General: No focal deficit present.      Mental Status: She is alert and oriented to person, place, and time.   Psychiatric:         Mood and Affect: Mood normal.         Judgment: Judgment normal.     No visits with results within 1 Week(s) from this visit.   Latest known visit with results is:   Routine Prenatal on 04/26/2017   Component Date Value Ref Range Status    Trichomonas vaginalis 04/26/2017 Negative  Negative Final    Gardnerella vaginalis 04/26/2017 Negative  Negative Final    Candida sp 04/26/2017 Negative  Negative Final           Assessment:       1. Thrombocytopenia, unspecified         Plan:       Patient with a mild thrombocytopenia since 2016.   Recent platelet count during pregnancy dropped down to 97K, which is normal for pregnancy.    Suspect she likely has a chronic ITP and will just need to monitor.  Patient also with h/o UC on chronic mesalamine. Mesalamine can cause thrombocytopenia, but more likely chronic ITP since she had this prior.    Will send full work-up today and I can call her with results.    Plan likely for observation only since platelets are mildly low.    RTC 6 months for follow-up with " repeat CBCdiff.     All questions answered at this time.    Cordelia Gould MD    Addendum:  Platelets normal 174, B12 and folate also normal and peripheral smear WNL.  Patient notified.  Will check her again in 6 months.    Cordelia Gould MD

## 2023-04-27 LAB — BEAKER SEE SCANNED REPORT: NORMAL

## 2023-10-10 ENCOUNTER — TELEPHONE (OUTPATIENT)
Dept: HEMATOLOGY/ONCOLOGY | Facility: CLINIC | Age: 38
End: 2023-10-10
Payer: COMMERCIAL

## 2023-10-24 ENCOUNTER — OFFICE VISIT (OUTPATIENT)
Dept: HEMATOLOGY/ONCOLOGY | Facility: CLINIC | Age: 38
End: 2023-10-24
Payer: COMMERCIAL

## 2023-10-24 VITALS
TEMPERATURE: 98 F | HEIGHT: 64 IN | DIASTOLIC BLOOD PRESSURE: 72 MMHG | HEART RATE: 60 BPM | OXYGEN SATURATION: 98 % | WEIGHT: 120.31 LBS | BODY MASS INDEX: 20.54 KG/M2 | RESPIRATION RATE: 14 BRPM | SYSTOLIC BLOOD PRESSURE: 107 MMHG

## 2023-10-24 DIAGNOSIS — D69.6 THROMBOCYTOPENIA: Primary | ICD-10-CM

## 2023-10-24 PROCEDURE — 99999 PR PBB SHADOW E&M-EST. PATIENT-LVL IV: ICD-10-PCS | Mod: PBBFAC,,, | Performed by: INTERNAL MEDICINE

## 2023-10-24 PROCEDURE — 3008F PR BODY MASS INDEX (BMI) DOCUMENTED: ICD-10-PCS | Mod: CPTII,S$GLB,, | Performed by: INTERNAL MEDICINE

## 2023-10-24 PROCEDURE — 3078F PR MOST RECENT DIASTOLIC BLOOD PRESSURE < 80 MM HG: ICD-10-PCS | Mod: CPTII,S$GLB,, | Performed by: INTERNAL MEDICINE

## 2023-10-24 PROCEDURE — 1160F PR REVIEW ALL MEDS BY PRESCRIBER/CLIN PHARMACIST DOCUMENTED: ICD-10-PCS | Mod: CPTII,S$GLB,, | Performed by: INTERNAL MEDICINE

## 2023-10-24 PROCEDURE — 3008F BODY MASS INDEX DOCD: CPT | Mod: CPTII,S$GLB,, | Performed by: INTERNAL MEDICINE

## 2023-10-24 PROCEDURE — 99213 OFFICE O/P EST LOW 20 MIN: CPT | Mod: S$GLB,,, | Performed by: INTERNAL MEDICINE

## 2023-10-24 PROCEDURE — 1159F MED LIST DOCD IN RCRD: CPT | Mod: CPTII,S$GLB,, | Performed by: INTERNAL MEDICINE

## 2023-10-24 PROCEDURE — 3078F DIAST BP <80 MM HG: CPT | Mod: CPTII,S$GLB,, | Performed by: INTERNAL MEDICINE

## 2023-10-24 PROCEDURE — 99213 PR OFFICE/OUTPT VISIT, EST, LEVL III, 20-29 MIN: ICD-10-PCS | Mod: S$GLB,,, | Performed by: INTERNAL MEDICINE

## 2023-10-24 PROCEDURE — 1160F RVW MEDS BY RX/DR IN RCRD: CPT | Mod: CPTII,S$GLB,, | Performed by: INTERNAL MEDICINE

## 2023-10-24 PROCEDURE — 3074F PR MOST RECENT SYSTOLIC BLOOD PRESSURE < 130 MM HG: ICD-10-PCS | Mod: CPTII,S$GLB,, | Performed by: INTERNAL MEDICINE

## 2023-10-24 PROCEDURE — 1159F PR MEDICATION LIST DOCUMENTED IN MEDICAL RECORD: ICD-10-PCS | Mod: CPTII,S$GLB,, | Performed by: INTERNAL MEDICINE

## 2023-10-24 PROCEDURE — 99999 PR PBB SHADOW E&M-EST. PATIENT-LVL IV: CPT | Mod: PBBFAC,,, | Performed by: INTERNAL MEDICINE

## 2023-10-24 PROCEDURE — 3074F SYST BP LT 130 MM HG: CPT | Mod: CPTII,S$GLB,, | Performed by: INTERNAL MEDICINE

## 2023-10-24 RX ORDER — DROSPIRENONE 4 MG/1
4 TABLET, FILM COATED ORAL
COMMUNITY
Start: 2023-04-10

## 2023-10-24 NOTE — PROGRESS NOTES
Subjective:       Patient ID: Mei Thomas is a 38 y.o. female.    GYN: Dr. Anais Washburn  PCP: Dr. Mathew Leung    Thrombocytopenia--Since 2016    Work-up:  4/24/23--peripheral smear WNL, folate 16.8, vitamin B12 456, PT/INR normal, PTT 34(slight elevation), platelet antibodies negative.    Platelets:  11/30/16--146  03/21/17--145  02/10/23--97  03/24/23--141  04/24/23--174  10/24/23--152    Current treatment: Observation    Chief Complaint: Other Misc (Pt reports no new concerns today.)    HPI  Patient presents for follow-up of thrombocytopenia. She denies any new problems since her last visit. She has h/o UC controlled on medications. Platelet count today is normal.    Past Medical History:   Diagnosis Date    Melanoma 2010      Past Surgical History:   Procedure Laterality Date    CYST REMOVAL      MOLE REMOVAL      MOUTH SURGERY       Family History   Problem Relation Age of Onset    Breast cancer Paternal Grandmother         dx in age 50's    Stroke Maternal Grandfather     No Known Problems Mother     Cancer Father         basal cell skin cancer    No Known Problems Sister     Multiple sclerosis Maternal Grandmother     No Known Problems Sister     Colon cancer Neg Hx     Ovarian cancer Neg Hx     Diabetes Neg Hx     Hypertension Neg Hx      Social History     Socioeconomic History    Marital status:    Occupational History    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  No children.         Review of patient's allergies indicates:  No Known Allergies   Current Outpatient Medications on File Prior to Visit   Medication Sig Dispense Refill    DECARA 625 mcg (25,000 unit) Cap capsule Take  "25,000 Units by mouth every 7 days.      drospirenone, contraceptive, (SLYND) 4 mg (28) Tab Take 4 mg by mouth.      mesalamine (LIALDA) 1.2 gram TbEC Take 2.4 g by mouth.      prenatal vitamin #45-iron-FA 28 mg iron- 1 mg Chew Take by mouth.      sertraline (ZOLOFT) 100 MG tablet Take 1 tablet by mouth every morning.      valacyclovir (VALTREX) 1000 MG tablet For an outbreak take 2 tablets, followed 12 hours later by 2 more tablets. (Patient taking differently: as needed. For an outbreak take 2 tablets, followed 12 hours later by 2 more tablets.) 24 tablet 0    albuterol 90 mcg/actuation inhaler Take one to two puffs q 4-q6 hrs as needed for cough (Patient not taking: Reported on 4/24/2023) 18 g 0    CANASA 1,000 mg Supp       pramoxine-hydrocortisone (PROCTOCREAM-HC) 1-1 % rectal cream Place rectally 3 (three) times daily. (Patient not taking: Reported on 4/24/2023) 28.4 g 1     No current facility-administered medications on file prior to visit.      Review of Systems   Constitutional:  Negative for appetite change, fatigue, fever and unexpected weight change.   HENT:  Negative for mouth sores.    Eyes: Negative.    Respiratory:  Negative for cough and shortness of breath.    Cardiovascular:  Negative for chest pain and leg swelling.   Gastrointestinal:  Negative for abdominal distention, abdominal pain, constipation, diarrhea, nausea, vomiting and reflux.   Genitourinary:  Negative for difficulty urinating, dysuria and hematuria.   Musculoskeletal:  Negative for arthralgias and back pain.   Integumentary:  Negative for rash.   Neurological:  Negative for weakness and headaches.   Hematological:  Negative for adenopathy.   Psychiatric/Behavioral:  Negative for sleep disturbance. The patient is not nervous/anxious.             Vitals:    10/24/23 0949   BP: 107/72   Pulse: 60   Resp: 14   Temp: 98.1 °F (36.7 °C)   TempSrc: Oral   SpO2: 98%   Weight: 54.6 kg (120 lb 4.8 oz)   Height: 5' 4" (1.626 m)        Physical " Exam  Constitutional:       Appearance: Normal appearance.   HENT:      Head: Normocephalic.      Nose: Nose normal.      Mouth/Throat:      Mouth: Mucous membranes are moist.   Eyes:      Extraocular Movements: Extraocular movements intact.      Conjunctiva/sclera: Conjunctivae normal.   Cardiovascular:      Rate and Rhythm: Normal rate and regular rhythm.   Pulmonary:      Effort: Pulmonary effort is normal.      Breath sounds: Normal breath sounds.   Abdominal:      General: Bowel sounds are normal. There is no distension.      Palpations: Abdomen is soft.      Tenderness: There is no abdominal tenderness.   Musculoskeletal:         General: Normal range of motion.   Skin:     General: Skin is warm.   Neurological:      General: No focal deficit present.      Mental Status: She is alert and oriented to person, place, and time.   Psychiatric:         Mood and Affect: Mood normal.         Judgment: Judgment normal.       Lab Visit on 10/24/2023   Component Date Value Ref Range Status    WBC 10/24/2023 5.41  4.50 - 11.50 x10(3)/mcL Final    RBC 10/24/2023 4.42  4.20 - 5.40 x10(6)/mcL Final    Hgb 10/24/2023 12.8  12.0 - 16.0 g/dL Final    Hct 10/24/2023 40.8  37.0 - 47.0 % Final    MCV 10/24/2023 92.3  80.0 - 94.0 fL Final    MCH 10/24/2023 29.0  27.0 - 31.0 pg Final    MCHC 10/24/2023 31.4 (L)  33.0 - 36.0 g/dL Final    RDW 10/24/2023 12.4  11.5 - 17.0 % Final    Platelet 10/24/2023 152  130 - 400 x10(3)/mcL Final    MPV 10/24/2023 11.2 (H)  7.4 - 10.4 fL Final    Neut % 10/24/2023 59.5  % Final    Lymph % 10/24/2023 26.1  % Final    Mono % 10/24/2023 8.1  % Final    Eos % 10/24/2023 5.7  % Final    Basophil % 10/24/2023 0.6  % Final    Lymph # 10/24/2023 1.41  0.6 - 4.6 x10(3)/mcL Final    Neut # 10/24/2023 3.22  2.1 - 9.2 x10(3)/mcL Final    Mono # 10/24/2023 0.44  0.1 - 1.3 x10(3)/mcL Final    Eos # 10/24/2023 0.31  0 - 0.9 x10(3)/mcL Final    Baso # 10/24/2023 0.03  <=0.2 x10(3)/mcL Final    IG# 10/24/2023 0.00   0 - 0.04 x10(3)/mcL Final    IG% 10/24/2023 0.0  % Final         Assessment:       1. Thrombocytopenia        Plan:       Patient with a mild thrombocytopenia since 2016.   Platelet count during pregnancy dropped down to 97K.    Suspect she likely has a mild chronic ITP and will just need to monitor.  Patient also with h/o UC on chronic mesalamine. Mesalamine can cause thrombocytopenia, but more likely chronic ITP since she had this prior.  Work-up done in 4/2023 negative, including negative platelet antibody.  Plan for continued observation.    Platelet count today is normal and CBC otherwise WNL  Since this is mild, will change visits to annually.  Patient can contact us in between if needed.    RTC 1 year for follow-up with CBC same day as visit.     All questions answered at this time.    Cordelia Gould MD

## 2024-11-01 ENCOUNTER — LAB VISIT (OUTPATIENT)
Dept: LAB | Facility: HOSPITAL | Age: 39
End: 2024-11-01
Attending: INTERNAL MEDICINE
Payer: COMMERCIAL

## 2024-11-01 ENCOUNTER — OFFICE VISIT (OUTPATIENT)
Dept: HEMATOLOGY/ONCOLOGY | Facility: CLINIC | Age: 39
End: 2024-11-01
Payer: COMMERCIAL

## 2024-11-01 VITALS
BODY MASS INDEX: 20.93 KG/M2 | SYSTOLIC BLOOD PRESSURE: 123 MMHG | HEART RATE: 59 BPM | WEIGHT: 122.63 LBS | HEIGHT: 64 IN | TEMPERATURE: 98 F | OXYGEN SATURATION: 100 % | RESPIRATION RATE: 14 BRPM | DIASTOLIC BLOOD PRESSURE: 82 MMHG

## 2024-11-01 DIAGNOSIS — K51.80 OTHER ULCERATIVE COLITIS WITHOUT COMPLICATION: ICD-10-CM

## 2024-11-01 DIAGNOSIS — D69.6 THROMBOCYTOPENIA: ICD-10-CM

## 2024-11-01 DIAGNOSIS — D69.6 THROMBOCYTOPENIA: Primary | ICD-10-CM

## 2024-11-01 LAB
BASOPHILS # BLD AUTO: 0.03 X10(3)/MCL
BASOPHILS NFR BLD AUTO: 0.6 %
EOSINOPHIL # BLD AUTO: 0.49 X10(3)/MCL (ref 0–0.9)
EOSINOPHIL NFR BLD AUTO: 9.2 %
ERYTHROCYTE [DISTWIDTH] IN BLOOD BY AUTOMATED COUNT: 12.1 % (ref 11.5–17)
HCT VFR BLD AUTO: 42.2 % (ref 37–47)
HGB BLD-MCNC: 14.1 G/DL (ref 12–16)
IMM GRANULOCYTES # BLD AUTO: 0.01 X10(3)/MCL (ref 0–0.04)
IMM GRANULOCYTES NFR BLD AUTO: 0.2 %
LYMPHOCYTES # BLD AUTO: 1.26 X10(3)/MCL (ref 0.6–4.6)
LYMPHOCYTES NFR BLD AUTO: 23.7 %
MCH RBC QN AUTO: 30.6 PG (ref 27–31)
MCHC RBC AUTO-ENTMCNC: 33.4 G/DL (ref 33–36)
MCV RBC AUTO: 91.5 FL (ref 80–94)
MONOCYTES # BLD AUTO: 0.42 X10(3)/MCL (ref 0.1–1.3)
MONOCYTES NFR BLD AUTO: 7.9 %
NEUTROPHILS # BLD AUTO: 3.1 X10(3)/MCL (ref 2.1–9.2)
NEUTROPHILS NFR BLD AUTO: 58.4 %
PLATELET # BLD AUTO: 151 X10(3)/MCL (ref 130–400)
PMV BLD AUTO: 11.1 FL (ref 7.4–10.4)
RBC # BLD AUTO: 4.61 X10(6)/MCL (ref 4.2–5.4)
WBC # BLD AUTO: 5.31 X10(3)/MCL (ref 4.5–11.5)

## 2024-11-01 PROCEDURE — 85025 COMPLETE CBC W/AUTO DIFF WBC: CPT

## 2024-11-01 PROCEDURE — 99999 PR PBB SHADOW E&M-EST. PATIENT-LVL IV: CPT | Mod: PBBFAC,,, | Performed by: NURSE PRACTITIONER

## 2024-11-01 PROCEDURE — 36415 COLL VENOUS BLD VENIPUNCTURE: CPT
